# Patient Record
Sex: FEMALE
[De-identification: names, ages, dates, MRNs, and addresses within clinical notes are randomized per-mention and may not be internally consistent; named-entity substitution may affect disease eponyms.]

---

## 2023-07-05 PROBLEM — Z00.00 ENCOUNTER FOR PREVENTIVE HEALTH EXAMINATION: Status: ACTIVE | Noted: 2023-07-05

## 2023-07-06 ENCOUNTER — NON-APPOINTMENT (OUTPATIENT)
Age: 68
End: 2023-07-06

## 2023-07-06 ENCOUNTER — APPOINTMENT (OUTPATIENT)
Dept: ORTHOPEDIC SURGERY | Facility: CLINIC | Age: 68
End: 2023-07-06
Payer: MEDICARE

## 2023-07-06 VITALS
WEIGHT: 143 LBS | BODY MASS INDEX: 22.98 KG/M2 | HEART RATE: 90 BPM | DIASTOLIC BLOOD PRESSURE: 78 MMHG | HEIGHT: 66 IN | SYSTOLIC BLOOD PRESSURE: 120 MMHG

## 2023-07-06 PROCEDURE — 99204 OFFICE O/P NEW MOD 45 MIN: CPT

## 2023-07-06 PROCEDURE — 28470 CLTX METATARSAL FX WO MNP EA: CPT | Mod: T4

## 2023-07-06 PROCEDURE — 73630 X-RAY EXAM OF FOOT: CPT | Mod: LT

## 2023-07-06 RX ORDER — ASPIRIN 325 MG/1
325 TABLET, COATED ORAL
Qty: 30 | Refills: 2 | Status: ACTIVE | COMMUNITY
Start: 2023-07-06 | End: 1900-01-01

## 2023-07-06 RX ORDER — ERGOCALCIFEROL 1.25 MG/1
1.25 MG CAPSULE, LIQUID FILLED ORAL
Qty: 6 | Refills: 1 | Status: ACTIVE | COMMUNITY
Start: 2023-07-06 | End: 1900-01-01

## 2023-07-06 NOTE — DISCUSSION/SUMMARY
[de-identified] : Today I had a lengthy discussion with the patient regarding their left foot pain.I have addressed all the patient's concerns surrounding the pathology of their condition. XR imaging was completed in office today and the results were reviewed with the patient.  I recommended that the patient utilize a CAM boot. The patient was fitted for the CAM boot in the office today. The patient was educated about the boot wear pattern and utilization, as well as the timeframe to come out of the boot. She was also given full instructions for using the boot. I recommend that the patient utilize 50,000 units of vitamin D. A prescription was given in the office today. I advised the patient to utilize 325 mg of Aspirin as instructed for blood thinning purposes. The prescription for the Aspirin was provided for the patient in the office today. I encouraged the patient to quit smoking and educated them about the risks that smoking has on bone healing. I recommend that the patient utilize ice, NSAIDS PRN, and heat. They can also elevate their left foot above the level of the heart. The patient understood and verbally agreed to the treatment plan. All of their questions were answered and they were satisfied with the visit. The patient should call the office if they have any questions or experience worsening symptoms.\par \par I would like to see the patient back in the office in 3 weeks to reassess their condition.\par

## 2023-07-06 NOTE — PHYSICAL EXAM
[de-identified] : Left Foot Exam\par \par General: Alert and oriented x3.  In no acute distress.  Pleasant in nature with a normal affect.  No apparent respiratory distress. \par Erythema, Warmth, Rubor: Negative\par Swelling: Positive \par \par ROM Ankle:\par 1. Dorsiflexion: 10 degrees\par 2. Plantarflexion: 40 degrees\par 3. Inversion: 10 degrees\par 4. Eversion: 10 degrees\par \par ROM of digits: Normal\par \par Tenderness to Palpation: \par 1. Lateral Malleolus: Negative\par 2. Medial Malleolus: Negative\par 3. Heel Pain: Negative\par 4. LisFranc Joint Pain: Negative\par 5. First MTP Joint: Negative\par \par Tenderness Metatarsals:\par 1st MT: Negative\par 2nd MT: Negative\par 3rd MT: Negative\par 4th MT: Negative\par 5th MT: Negative\par Base of the 5th MT: Positive\par \par Tendon Pain:\par 1. Posterior Tibialis: Negative\par 2. Peroneus Brevis/Longus: Negative\par \par Ligament Pain:\par 1. ATFL/CFL/PTFL: Negative\par 2. Deltoid Ligaments: Negative\par \par Stability: \par 1. Anterior Drawer: Negative\par 2. Posterior Drawer: Negative\par \par Strength: \par 5/5 TA/GS/EHL/FHL/EDL/ADD/ABD\par \par Pulses: 2+ DP/PT Pulses\par \par Neuro: Intact motor and sensory throughout\par \par Additional Test:\par 1. Shankar's Test: Negative\par 2. Tarsal Tunnel Tinel's Sign (Posterior Tibial Nerve Impingement): Negative\par 3. Single Heel Rise: Negative\par 4. Bilateral pes planus: Negative\par \par  [de-identified] : 3V of the left foot were ordered obtained and reviewed by me today, 07/06/2023 , revealed: base of 5th metatarsal fracture\par

## 2023-07-06 NOTE — HISTORY OF PRESENT ILLNESS
[FreeTextEntry1] : The patient is a 67 year old female presenting for an initial visit of her left foot fracture. On 6/26/2023, The patient states that she was on a ladder when she lost control of the object that she was holding, fell down 2 steps and rolled her foot. She went to Urgent Care that same day where she she was put in a short leg splint and was given crutches. She presents today NWB. She endorses lateral foot pain, swelling and ecchymosis. She is a smoker. No other complaints.

## 2023-07-31 ENCOUNTER — APPOINTMENT (OUTPATIENT)
Dept: ORTHOPEDIC SURGERY | Facility: CLINIC | Age: 68
End: 2023-07-31
Payer: MEDICARE

## 2023-07-31 PROCEDURE — 99024 POSTOP FOLLOW-UP VISIT: CPT

## 2023-07-31 PROCEDURE — 73630 X-RAY EXAM OF FOOT: CPT | Mod: LT

## 2023-07-31 NOTE — ADDENDUM
[FreeTextEntry1] : I, DARRICK ARAGON, acted solely as a scribe for Dr. José Manuel Bass on this date 07/31/2023  .   All medical record entries made by the Scribe were at my, Dr. José Manuel Bass, direction and personally dictated by me on 07/31/2023 . I have reviewed the chart and agree that the record accurately reflects my personal performance of the history, physical exam, assessment and plan. I have also personally directed, reviewed, and agreed with the chart.

## 2023-07-31 NOTE — PHYSICAL EXAM
[de-identified] : Left Foot Exam\par  \par  General: Alert and oriented x3.  In no acute distress.  Pleasant in nature with a normal affect.  No apparent respiratory distress. \par  Erythema, Warmth, Rubor: Negative\par  Swelling: Positive \par  \par  ROM Ankle:\par  1. Dorsiflexion: 10 degrees\par  2. Plantarflexion: 40 degrees\par  3. Inversion: 10 degrees\par  4. Eversion: 10 degrees\par  \par  ROM of digits: Normal\par  \par  Tenderness to Palpation: \par  1. Lateral Malleolus: Negative\par  2. Medial Malleolus: Negative\par  3. Heel Pain: Negative\par  4. LisFranc Joint Pain: Negative\par  5. First MTP Joint: Negative\par  \par  Tenderness Metatarsals:\par  1st MT: Negative\par  2nd MT: Negative\par  3rd MT: Negative\par  4th MT: Negative\par  5th MT: Negative\par  Base of the 5th MT: Positive\par  \par  Tendon Pain:\par  1. Posterior Tibialis: Negative\par  2. Peroneus Brevis/Longus: Negative\par  \par  Ligament Pain:\par  1. ATFL/CFL/PTFL: Negative\par  2. Deltoid Ligaments: Negative\par  \par  Stability: \par  1. Anterior Drawer: Negative\par  2. Posterior Drawer: Negative\par  \par  Strength: \par  5/5 TA/GS/EHL/FHL/EDL/ADD/ABD\par  \par  Pulses: 2+ DP/PT Pulses\par  \par  Neuro: Intact motor and sensory throughout\par  \par  Additional Test:\par  1. Shankar's Test: Negative\par  2. Tarsal Tunnel Tinel's Sign (Posterior Tibial Nerve Impingement): Negative\par  3. Single Heel Rise: Negative\par  4. Bilateral pes planus: Negative\par  \par   [de-identified] : 3V of the left foot were ordered obtained and reviewed by me today, 07/31/2023 , revealed: base of 5th metatarsal fracture, healed.

## 2023-07-31 NOTE — DISCUSSION/SUMMARY
[de-identified] : Today I had a lengthy discussion with the patient regarding their left foot fracture. I have addressed all the patient's concerns surrounding the pathology of their condition. XR films obtained today were reviewed in great detail with the patient. At this time, the patient will begin to transition out of the CAM boot and into a regular sneaker over the next couple of days. I recommend that the patient utilize ice, NSAIDS PRN, and heat. They can also elevate their left foot above the level of the heart.  I recommend the patient follow up in 3-4 weeks to reassess their condition.   The patient understood and verbally agreed to the treatment plan. All of their questions were answered and they were satisfied with the visit. The patient should call the office if they have any questions or experience worsening symptoms.

## 2023-07-31 NOTE — HISTORY OF PRESENT ILLNESS
[FreeTextEntry1] : 7/31/23: MATILDE WING is a 67 year old female who presents for follow up evaluation of her left foot fracture. She states that her pain has improved since the previous office visit. She presents today wearing a CAM boot and is ambulating without assistance.   7/6/23: The patient is a 67 year old female presenting for an initial visit of her left foot fracture. On 6/26/2023, The patient states that she was on a ladder when she lost control of the object that she was holding, fell down 2 steps and rolled her foot. She went to Urgent Care that same day where she she was put in a short leg splint and was given crutches. She presents today NWB. She endorses lateral foot pain, swelling and ecchymosis. She is a smoker. No other complaints.

## 2023-08-31 ENCOUNTER — APPOINTMENT (OUTPATIENT)
Dept: ORTHOPEDIC SURGERY | Facility: CLINIC | Age: 68
End: 2023-08-31
Payer: MEDICARE

## 2023-08-31 DIAGNOSIS — S92.352A DISPLACED FRACTURE OF FIFTH METATARSAL BONE, LEFT FOOT, INITIAL ENCOUNTER FOR CLOSED FRACTURE: ICD-10-CM

## 2023-08-31 PROCEDURE — 73630 X-RAY EXAM OF FOOT: CPT | Mod: LT

## 2023-08-31 PROCEDURE — 99024 POSTOP FOLLOW-UP VISIT: CPT

## 2023-08-31 NOTE — ADDENDUM
[FreeTextEntry1] : I, ROGELIO PAUL, acted solely as a scribe for Dr. José Manuel Bass on this date 08/31/2023  .   All medical record entries made by the Scribe were at my, Dr. José Manuel Bass, direction and personally dictated by me on 08/31/2023 . I have reviewed the chart and agree that the record accurately reflects my personal performance of the history, physical exam, assessment and plan. I have also personally directed, reviewed, and agreed with the chart.

## 2023-08-31 NOTE — HISTORY OF PRESENT ILLNESS
[FreeTextEntry1] : 8/31/23:  MATILDE WING is a 67 year old female who presents for follow up evaluation of her left foot fracture. She states that she is feeling an improvement since her previous visit. She has minimal pain in the area. She presents today wearing sneakers and is ambulating without assistance. No other complaints.   7/31/23: MATILDE WING is a 67 year old female who presents for follow up evaluation of her left foot fracture. She states that her pain has improved since the previous office visit. She presents today wearing a CAM boot and is ambulating without assistance.   7/6/23: The patient is a 67 year old female presenting for an initial visit of her left foot fracture. On 6/26/2023, The patient states that she was on a ladder when she lost control of the object that she was holding, fell down 2 steps and rolled her foot. She went to Urgent Care that same day where she she was put in a short leg splint and was given crutches. She presents today NWB. She endorses lateral foot pain, swelling and ecchymosis. She is a smoker. No other complaints.

## 2023-10-18 ENCOUNTER — APPOINTMENT (OUTPATIENT)
Dept: INTERNAL MEDICINE | Facility: CLINIC | Age: 68
End: 2023-10-18
Payer: MEDICARE

## 2023-10-18 VITALS
OXYGEN SATURATION: 99 % | WEIGHT: 142 LBS | TEMPERATURE: 98.5 F | HEART RATE: 96 BPM | HEIGHT: 65 IN | DIASTOLIC BLOOD PRESSURE: 80 MMHG | SYSTOLIC BLOOD PRESSURE: 130 MMHG | BODY MASS INDEX: 23.66 KG/M2

## 2023-10-18 DIAGNOSIS — J40 BRONCHITIS, NOT SPECIFIED AS ACUTE OR CHRONIC: ICD-10-CM

## 2023-10-18 DIAGNOSIS — F17.200 NICOTINE DEPENDENCE, UNSPECIFIED, UNCOMPLICATED: ICD-10-CM

## 2023-10-18 DIAGNOSIS — E04.9 NONTOXIC GOITER, UNSPECIFIED: ICD-10-CM

## 2023-10-18 DIAGNOSIS — J01.90 ACUTE SINUSITIS, UNSPECIFIED: ICD-10-CM

## 2023-10-18 PROCEDURE — 99204 OFFICE O/P NEW MOD 45 MIN: CPT

## 2023-12-29 ENCOUNTER — APPOINTMENT (OUTPATIENT)
Dept: INTERNAL MEDICINE | Facility: CLINIC | Age: 68
End: 2023-12-29

## 2024-03-15 ENCOUNTER — APPOINTMENT (OUTPATIENT)
Dept: INTERNAL MEDICINE | Facility: CLINIC | Age: 69
End: 2024-03-15
Payer: MEDICARE

## 2024-03-15 VITALS
TEMPERATURE: 98.9 F | OXYGEN SATURATION: 98 % | BODY MASS INDEX: 23.8 KG/M2 | HEART RATE: 90 BPM | SYSTOLIC BLOOD PRESSURE: 140 MMHG | DIASTOLIC BLOOD PRESSURE: 80 MMHG | WEIGHT: 143 LBS

## 2024-03-15 DIAGNOSIS — R05.9 COUGH, UNSPECIFIED: ICD-10-CM

## 2024-03-15 PROCEDURE — 99214 OFFICE O/P EST MOD 30 MIN: CPT

## 2024-03-15 NOTE — REVIEW OF SYSTEMS
[Chills] : no chills [Fever] : no fever [Recent Change In Weight] : ~T no recent weight change [Fatigue] : no fatigue [Negative] : Gastrointestinal

## 2024-03-15 NOTE — HISTORY OF PRESENT ILLNESS
[FreeTextEntry8] : Patient presents to office with a several week history of cough that she describes as being slightly productive of brown sputum. She states the cough is usually worse when she lies down and when she arises in the morning from sleep. She denies any chest pain, shortness of breath, hemoptysis.  She denies any history of GERD. She was last seen in October 2023 for an upper respiratory infection treated with Z-Alden which she states it was slowly improving but symptoms have recently worsened.  She denies any recent upper respiratory infection, fever or chills Patient also has a history of a multinodular goiter, large for which she has seen endocrinology many years ago but never followed up. She denies any difficulty swallowing, weight loss, anorexia.  Patient states that she is an occasional smoker She states that all her life she used to sleep with this light nightgown but recently she has been feeling cold and has to wear something heavier and then she finds that she has night sweats.

## 2024-03-15 NOTE — PHYSICAL EXAM
[No Acute Distress] : no acute distress [Well Nourished] : well nourished [Well Developed] : well developed [Normal Sclera/Conjunctiva] : normal sclera/conjunctiva [Normal TMs] : both tympanic membranes were normal [Normal Nasal Mucosa] : the nasal mucosa was normal [No Lymphadenopathy] : no lymphadenopathy [No Abdominal Bruit] : a ~M bruit was not heard ~T in the abdomen [No Edema] : there was no peripheral edema [No Palpable Aorta] : no palpable aorta [Normal Supraclavicular Nodes] : no supraclavicular lymphadenopathy [Normal] : soft, non-tender, non-distended, no masses palpated, no HSM and normal bowel sounds [No CVA Tenderness] : no CVA  tenderness [Normal Anterior Cervical Nodes] : no anterior cervical lymphadenopathy [Normal Posterior Cervical Nodes] : no posterior cervical lymphadenopathy [de-identified] : Slight pharyngeal erythema, no exudate [No Rash] : no rash [de-identified] : Positive large multinodular goiter with a prominent right thyroid nodule

## 2024-03-18 ENCOUNTER — APPOINTMENT (OUTPATIENT)
Dept: RADIOLOGY | Facility: CLINIC | Age: 69
End: 2024-03-18
Payer: MEDICARE

## 2024-03-18 PROCEDURE — 71046 X-RAY EXAM CHEST 2 VIEWS: CPT

## 2024-03-19 ENCOUNTER — LABORATORY RESULT (OUTPATIENT)
Age: 69
End: 2024-03-19

## 2024-03-19 LAB
ALBUMIN SERPL ELPH-MCNC: 4.4 G/DL
ALP BLD-CCNC: 99 U/L
ALT SERPL-CCNC: 13 U/L
ANION GAP SERPL CALC-SCNC: 12 MMOL/L
APPEARANCE: CLEAR
AST SERPL-CCNC: 15 U/L
BACTERIA: NEGATIVE /HPF
BASOPHILS # BLD AUTO: 0.07 K/UL
BASOPHILS NFR BLD AUTO: 0.7 %
BILIRUB SERPL-MCNC: 0.3 MG/DL
BILIRUBIN URINE: NEGATIVE
BLOOD URINE: NEGATIVE
BUN SERPL-MCNC: 15 MG/DL
CALCIUM SERPL-MCNC: 9.5 MG/DL
CAST: 0 /LPF
CHLORIDE SERPL-SCNC: 105 MMOL/L
CHOLEST SERPL-MCNC: 365 MG/DL
CO2 SERPL-SCNC: 26 MMOL/L
COLOR: YELLOW
CREAT SERPL-MCNC: 0.86 MG/DL
EGFR: 74 ML/MIN/1.73M2
EOSINOPHIL # BLD AUTO: 0.09 K/UL
EOSINOPHIL NFR BLD AUTO: 0.9 %
EPITHELIAL CELLS: 1 /HPF
GLUCOSE QUALITATIVE U: NEGATIVE MG/DL
GLUCOSE SERPL-MCNC: 102 MG/DL
HCT VFR BLD CALC: 44.1 %
HDLC SERPL-MCNC: 52 MG/DL
HGB BLD-MCNC: 14.3 G/DL
IMM GRANULOCYTES NFR BLD AUTO: 0.3 %
KETONES URINE: NEGATIVE MG/DL
LDLC SERPL CALC-MCNC: 273 MG/DL
LEUKOCYTE ESTERASE URINE: NEGATIVE
LYMPHOCYTES # BLD AUTO: 3.19 K/UL
LYMPHOCYTES NFR BLD AUTO: 31 %
MAN DIFF?: NORMAL
MCHC RBC-ENTMCNC: 29.7 PG
MCHC RBC-ENTMCNC: 32.4 GM/DL
MCV RBC AUTO: 91.7 FL
MICROSCOPIC-UA: NORMAL
MONOCYTES # BLD AUTO: 0.68 K/UL
MONOCYTES NFR BLD AUTO: 6.6 %
NEUTROPHILS # BLD AUTO: 6.23 K/UL
NEUTROPHILS NFR BLD AUTO: 60.5 %
NITRITE URINE: NEGATIVE
NONHDLC SERPL-MCNC: 313 MG/DL
PH URINE: 6.5
PLATELET # BLD AUTO: 315 K/UL
POTASSIUM SERPL-SCNC: 4.4 MMOL/L
PROT SERPL-MCNC: 7 G/DL
PROTEIN URINE: NEGATIVE MG/DL
RBC # BLD: 4.81 M/UL
RBC # FLD: 13.2 %
RED BLOOD CELLS URINE: 0 /HPF
SODIUM SERPL-SCNC: 142 MMOL/L
SPECIFIC GRAVITY URINE: 1.01
T3 SERPL-MCNC: 99 NG/DL
T4 FREE SERPL-MCNC: 1.3 NG/DL
TRIGL SERPL-MCNC: 196 MG/DL
TSH SERPL-ACNC: 0.07 UIU/ML
UROBILINOGEN URINE: 0.2 MG/DL
WBC # FLD AUTO: 10.29 K/UL
WHITE BLOOD CELLS URINE: 0 /HPF

## 2024-03-26 ENCOUNTER — APPOINTMENT (OUTPATIENT)
Dept: INTERNAL MEDICINE | Facility: CLINIC | Age: 69
End: 2024-03-26
Payer: MEDICARE

## 2024-03-26 ENCOUNTER — NON-APPOINTMENT (OUTPATIENT)
Age: 69
End: 2024-03-26

## 2024-03-26 VITALS
WEIGHT: 144 LBS | DIASTOLIC BLOOD PRESSURE: 90 MMHG | BODY MASS INDEX: 23.96 KG/M2 | HEART RATE: 95 BPM | SYSTOLIC BLOOD PRESSURE: 160 MMHG | OXYGEN SATURATION: 98 %

## 2024-03-26 DIAGNOSIS — M54.2 CERVICALGIA: ICD-10-CM

## 2024-03-26 DIAGNOSIS — E04.2 NONTOXIC MULTINODULAR GOITER: ICD-10-CM

## 2024-03-26 PROCEDURE — 99213 OFFICE O/P EST LOW 20 MIN: CPT

## 2024-03-26 RX ORDER — FLUTICASONE PROPIONATE 50 UG/1
50 SPRAY, METERED NASAL
Qty: 1 | Refills: 1 | Status: DISCONTINUED | COMMUNITY
Start: 2024-03-15 | End: 2024-03-26

## 2024-03-26 RX ORDER — AZITHROMYCIN 250 MG/1
250 TABLET, FILM COATED ORAL
Qty: 1 | Refills: 0 | Status: DISCONTINUED | COMMUNITY
Start: 2023-10-18 | End: 2024-03-26

## 2024-03-26 NOTE — PHYSICAL EXAM
[No Acute Distress] : no acute distress [Normal Sclera/Conjunctiva] : normal sclera/conjunctiva [Normal] : normal rate, regular rhythm, normal S1 and S2 and no murmur heard [Normal Supraclavicular Nodes] : no supraclavicular lymphadenopathy [Normal Posterior Cervical Nodes] : no posterior cervical lymphadenopathy [Normal Anterior Cervical Nodes] : no anterior cervical lymphadenopathy [No CVA Tenderness] : no CVA  tenderness [No Spinal Tenderness] : no spinal tenderness [No Rash] : no rash [de-identified] : Positive goiter [de-identified] : C-spine full range of motion in all directions

## 2024-03-26 NOTE — HISTORY OF PRESENT ILLNESS
[FreeTextEntry1] : Routine follow up [de-identified] : Patient presents to office requesting an x-ray of her neck area.  She is under the care of a chiropractor who requested an x-ray of her cervical spine prior to treatment.  She has been going to this chiropractor for many years and has found improvement in her neck symptoms. She denies any localized weakness, numbness or tingling.  There is no prior history of injury.

## 2024-08-16 ENCOUNTER — APPOINTMENT (OUTPATIENT)
Dept: INTERNAL MEDICINE | Facility: CLINIC | Age: 69
End: 2024-08-16
Payer: MEDICARE

## 2024-08-16 VITALS
HEIGHT: 65 IN | SYSTOLIC BLOOD PRESSURE: 162 MMHG | WEIGHT: 145 LBS | DIASTOLIC BLOOD PRESSURE: 76 MMHG | OXYGEN SATURATION: 97 % | BODY MASS INDEX: 24.16 KG/M2 | TEMPERATURE: 98.7 F | HEART RATE: 91 BPM | RESPIRATION RATE: 12 BRPM

## 2024-08-16 VITALS — SYSTOLIC BLOOD PRESSURE: 146 MMHG | DIASTOLIC BLOOD PRESSURE: 80 MMHG

## 2024-08-16 DIAGNOSIS — J40 BRONCHITIS, NOT SPECIFIED AS ACUTE OR CHRONIC: ICD-10-CM

## 2024-08-16 PROCEDURE — G2211 COMPLEX E/M VISIT ADD ON: CPT

## 2024-08-16 PROCEDURE — 99213 OFFICE O/P EST LOW 20 MIN: CPT

## 2024-08-16 RX ORDER — AZITHROMYCIN 250 MG/1
250 TABLET, FILM COATED ORAL
Qty: 1 | Refills: 0 | Status: ACTIVE | COMMUNITY
Start: 2024-08-16 | End: 1900-01-01

## 2024-08-16 NOTE — PHYSICAL EXAM
[No Acute Distress] : no acute distress [Well Nourished] : well nourished [Well Developed] : well developed [Well-Appearing] : well-appearing [Normal Sclera/Conjunctiva] : normal sclera/conjunctiva [Normal Outer Ear/Nose] : the outer ears and nose were normal in appearance [Normal Oropharynx] : the oropharynx was normal [Normal TMs] : both tympanic membranes were normal [No Lymphadenopathy] : no lymphadenopathy [No Respiratory Distress] : no respiratory distress  [No Accessory Muscle Use] : no accessory muscle use [Clear to Auscultation] : lungs were clear to auscultation bilaterally [Normal Rate] : normal rate  [Regular Rhythm] : with a regular rhythm [Normal S1, S2] : normal S1 and S2 [No Murmur] : no murmur heard [No Edema] : there was no peripheral edema [de-identified] : Large goiter appreciated

## 2024-08-16 NOTE — HISTORY OF PRESENT ILLNESS
[FreeTextEntry8] : Patria is here today with concern about bronchitis.  Reports feeling sick about 1 week ago.  Reports symptoms started with sensation of feeling cold and some sweats.  She also had runny nose.  She then developed cough.  Cough is dry nonproductive.  Has had bronchitis before in the past as of that this feels similar.  No sputum production.  She had an old amoxicillin managing 500 mg amoxicillin 3 times a day.  Reports she has been taking it for the last 2 to 3 days without improvement.  Unclear where she had had this prescription filled.  Reports multiple negative home COVID test.   was also sick.  Grandchildren also sick.

## 2024-08-16 NOTE — REVIEW OF SYSTEMS
[Fever] : no fever [Chills] : no chills [Night Sweats] : no night sweats [Nasal Discharge] : nasal discharge [Sore Throat] : no sore throat [Chest Pain] : no chest pain [Palpitations] : no palpitations [Shortness Of Breath] : no shortness of breath [Wheezing] : no wheezing [Cough] : no cough

## 2024-08-16 NOTE — ASSESSMENT
[FreeTextEntry1] : 1.  Bronchitis Azithromycin.  Advised to not take previously ordered antibiotics without indication Use over-the-counter cough medication Given recurrent bronchitis, recommend pulmonary consultation  Advised to return for CPE Inform patient that blood pressure was elevated again.  Advised to return for evaluation once feeling better